# Patient Record
Sex: FEMALE | Race: WHITE | NOT HISPANIC OR LATINO | URBAN - METROPOLITAN AREA
[De-identification: names, ages, dates, MRNs, and addresses within clinical notes are randomized per-mention and may not be internally consistent; named-entity substitution may affect disease eponyms.]

---

## 2018-10-09 NOTE — PATIENT DISCUSSION
CATARACT, OU - VISUALLY SIGNIFICANT OS &gt; OD. SCHEDULE SX OS THEN WILL HOLD OFF ON THE OD FOR NOW AND EVALUATE AFTER SURGERY.

## 2018-10-09 NOTE — PATIENT DISCUSSION
DISCUSSED POTENTIAL MATIPLUG STUDY, WILL HOLD OFF ON DROPS FOR NOW. PATIENT WOULD LIKE GENERIC DROPS IF DECLINES STUDY.

## 2018-10-09 NOTE — PATIENT DISCUSSION
Surgery  Counseling: I have discussed the option of glasses versus cataract surgery versus following. It was explained that when vision no longer meets the patient's visual needs and a new prescription for glasses is not likely to improve the patient's visual symptoms, the option of cataract surgery is a reasonable next step. It was explained that there is no guarantee that removing the cataract will improve their visual symptoms, however; it is believed that the cataract is contributing to the patient's visual impairment and surgery may significantly improve both the visual and functional status of the patient. The risks, benefits and alternatives of surgery were discussed with the patient. After this discussion, the patient desires to proceed with cataract surgery with implantation of an intraocular lens to improve night vision .

## 2018-10-18 NOTE — PATIENT DISCUSSION
Surgery  Counseling: I have discussed the option of glasses versus cataract surgery versus following . It was explained that when vision no longer meets the patient's visual needs and a new prescription for glasses is not likely to improve all of the patient's visual symptoms, the option of cataract surgery is a reasonable next step. It was explained that there is no guarantee that removing the cataract will improve their visual symptoms, however; it is believed that the cataract is contributing to the patient's visual impairment and surgery may significantly improve both the visual and functional status of the patient. The risks, benefits and alternatives of surgery were discussed with the patient. After this discussion, the patient desires to proceed with cataract surgery with implantation of an intraocular lens to improve vision for distance and glare.

## 2018-10-18 NOTE — PATIENT DISCUSSION
Continue: prednisolone acetate (PF) (prednisolone acetate (pf)): drops,suspension: 1% 1 drop once a day as directed into right eye

## 2020-03-04 NOTE — PATIENT DISCUSSION
"Urogyn follow up    [facility]  [address]  [facility] [address]    Sheree Lomeli  [id number]  1958    Routine GYN care: Darlien Lawrence NP    Visit 2012:  Sheree Lomeli is a 54 y.o. G3, P2-0-1-2 here for a urogyn follow up.    Mrs. Yani gill is a 54-year-old G3, P2-0-1-2 who reports a   suspected rectal injury after doing house work approximately three years   ago. She describes sensation of a rectal "tear" on excessive extension of   her back. Since that time, she has undergone evaluation per several   gastroenterology specialist. Colonoscopy 2006 is normal. A barium enema   2011 is suboptimal due to increased amount of stool in the bowel. She also  reports consultation with an orthopedist and MRI significant for   degenerative joint disease, but negative for neurologic injury. She   initially had discomfort in the left perianal area and sense of vaginal   bulge. Recently, she began to have issues with complete evacuation and   often splints perineally to help evacuate. She also notes use of MiraLax   can be helpful. Exam reveals a distal rectocele without obvious enterocele  or perineocele. There is tenderness to palpation perianally, especially at  the patient's left. There is also mild tenderness to palpation at the   internal levator ani complex, although no increased muscle tone. Rectal   exam reveals normal resting tone, but mildly decreased concentric squeeze.   Otherwise, there are no significant sphincter abnormalities. Perineal   sensation is grossly intact.     Last visit 3/2018:  In July, patient describes an episode of constipation lasting 6 days that caused increasing back pain. She used a colon cleanse to finally pass a BM. The constipation led to increasing back pain as described before. At this time she noticed 4 small brown spots about the size of a freckle on the left external labial skin when wiping. She noticed the bleeding originating from the brown spots. In August, " ***This patient had traditional cataract surgery performed. A monofocal  IOL was placed to achieve a target refraction of plano which should provide them with satisfactory  vision with the aid of glasses/contact lenses. *** noticed minimal bleeding on her pad again.     Pt has continued taking 4 teaspoons fiber daily 2AM and 2PM to help with BM's and has been successful. BM's daily without the need to splint.  Pt has been using Replens when she feels dry but denies vaginal estrogen use. She still describes mild stress incontinence but not bothersome. Pt continues to do kegel exercises to help strengthen her pelvic floor.     1. Perianal/rectal discomfort: The patient reports this is directly related to what sounds to be a musculoskeletal trauma. However, she has had significant workup per outside physicians with negative results. On exam, she does have some evidence of distal rectocele and often splints to help evacuate. Taking 25-30 g with fiber and fiber powder. Feels that pain is 70% better. Once has BM, pain is gone. Able to have BM most days. Is taking 4-5 tsps/day. No splinting anymore. No hematochezia. Does have some L-sided pressure before has BM, which is relieved with BM. Also gets back pain and foot pain before having BM, as well--improves with stool passage.   --pain started after she was reaching for something years ago, felt pop; better after passage of BM or flatus; worse with sitting--has a standing desk now, and this helps  --saw pain MD--was hoping to for injection but couldn't find correct spot; did start neurontin with 90% improvement x 1 month; then, pain started to return and had some leg/foot pain, which became severe with increasing dose--stopped once she stopped med; so, has stopped this neurontin now  --tailbone pain is 3-4/10 currently; can be as low as 1-2/10  --has completed back PT and pelvic floor with Lauren Shepley  2. Mixed urinary incontinence:   --previously:  Currently not bothersome. Continue to monitor. Basic behavioral modifications reviewed. Still occasionally with cough/sneeze. Is doing Kegel's.   --currently:  Started to notice more UI with moving/changing position over last 5 days.  Volumes  are very small.  Uses small liner.  Does still have some OLVIN.  No U/F.  Frequency:  Q4-5 hours.  Nocturia: 0-1x/PM (better on neurontin). No dysuria, hematuria. +complete emptying.     3. Atrophic vaginitis: +/- using Replens twice a week.  4. Rectocele:  6/25/12 FL Defecogram: Video recording of defecation was performed revealing some increase in the anal rectal angle as expected with puborectalis relaxation. The anal canal opens with defecation. There is a small anterior rectocele, but there is no significant residual after evacuation . The patient performs a rocking motion with intermittent straining and relaxation to achieve emptying. IMPRESSION: Small anterior rectocele.  --currently: no major symptoms  5. Constipation, defecatory dysfunction:  Feels like she may be rocking a bit more to help empty.  Has bee taking fiber to keep stool consistency ideal.  Has started stool softener since starting gabapentin.      Past Medical History:   Diagnosis Date    Arthritis     Back pain     Chronic pelvic pain in female     left-sided    De Quervain's tenosynovitis     left    Headache(784.0)     Reflux     Right carpal tunnel syndrome     Urinary incontinence     occ olvin     Past Surgical History:   Procedure Laterality Date    EPIDURAL STEROID INJECTION N/A 6/12/2018    Procedure: INJECTION-STEROID-EPIDURAL;  Surgeon: Rianna Nina MD;  Location: [facility];  Service: Pain Management;  Laterality: N/A;  Coccygeal Nerve Block  28712  (0.5mg Niravam only)    *Pt wants to know if she has NO SEDATION at all, can pt drive herself home*    OOPHORECTOMY      LSO--pain     Issues include:  1. Perianal/rectal discomfort with low, left back pain: The patient reports this is directly related to what sounds to be a musculoskeletal trauma. However, she has had significant workup per outside physicians with negative results. On exam, she does have some evidence of distal rectocele and often splints to help evacuate.  Taking 25-30 g with fiber and fiber powder. Feels that pain is 70% better. Once has BM, pain is gone. Able to have BM most days. Is taking 4-5 tsps/day. No splinting anymore. No hematochezia. Does have some L-sided pressure before has BM, which is relieved with BM. Also gets back pain and foot pain before having BM, as well--improves with stool passage.   --pain started after she was reaching for something years ago, felt pop; better after passage of BM or flatus; worse with sitting--has a standing desk now, and this helps  --saw pain MD--was hoping to for injection but couldn't find correct spot; did start neurontin with 90% improvement x 1 month; then, pain started to return and had some leg/foot pain, which became severe with increasing dose--stopped once she stopped med; so, has stopped this neurontin now; flexeril PRN helps back pain   --tailbone pain is 3-4/10 currently; can be as low as 1-2/10  --has completed back PT and pelvic floor with Lauren Shepley    TODAY:  --still has some issues with low back/left tailbone & buttock pain--mostly when she reaches forward, feels like she can only go so far before something will be very painful  --takes flexeril PRN--makes her sleepy enough so doesn't feel pain, not sure it really helps pain, though  --the only thing that alleviates pain is if she has BM or does colon cleanse/completely evacuates colon (can go 3 days without pain then)  --wakes in AM, has some pain; takes fiber, has BM easily QAM and pain greatly lessens; if doesn't have AM BM, can have worsening of pain; if has BM, pain starts to increase again as day goes on, can improve if has another BM later in day  --did have PF PT in past--didn't help but starting healthy back program in March  --not needing to splint anymore  2. Mixed urinary incontinence:   --previously:  Currently not bothersome. Continue to monitor. Basic behavioral modifications reviewed. Still occasionally with cough/sneeze. Is doing  Kegel's.   --currently:  Mild OLVIN (mostly with sneeze/cough/blowing nose).  No UUI. Volumes are very small.  Uses small liner. Frequency:  Q4-5 hours.  Nocturia: 0-1x/PM (better on neurontin). No dysuria, hematuria. +complete emptying.     3. Atrophic vaginitis: was using Replens; was switched to estradiol cream 2x/week  4. Rectocele:  6/25/12 FL Defecogram: Video recording of defecation was performed revealing some increase in the anal rectal angle as expected with puborectalis relaxation. The anal canal opens with defecation. There is a small anterior rectocele, but there is no significant residual after evacuation . The patient performs a rocking motion with intermittent straining and relaxation to achieve emptying. IMPRESSION: Small anterior rectocele.  --currently: feels some pressure on L side, can see when has mirror down there  5. Constipation, defecatory dysfunction:    --taking fiber 4 tsps/day has BM every AM, which is easy to pass  6. Abnormal pelvic US 3/3/2020:  --PCP got due to continued pelvic/back pain  Uterus:    Size: 5.8 x 2.8 x 4.2 cm    Appearance: Well-defined echogenic mass within the endocervical canal measuring 0.8 x 0.6 x 0.6 cm with significant vascular flow.  Complex fluid within the cervical canal.    Endometrium: Normal in this post menopausal patient, measuring 1 mm.    Right ovary:    Not visualized.    Left ovary:    Surgically absent.    Free Fluid:    None.      Impression       Echogenic mass within the endocervical canal measuring up to 0.8 cm, likely representing an endocervical polyp.  Complex fluid throughout the endocervical canal.  Recommend direct visualization and histologic sampling.     Well-woman:  --pap: 9/2018, normal; HPV neg  --mammogram: 2019, normal  --colonoscopy: 2006, normal.   --DEXA: 2011, normal    Medications:  Current Outpatient Medications   Medication Sig Dispense Refill    butalbital-aspirin-caffeine -40 mg (FIORINAL) -40 mg Cap Take 1  "capsule by mouth every 4 (four) hours as needed. 30 capsule 0    cyclobenzaprine (FLEXERIL) 5 MG tablet       estradiol (ESTRACE) 0.01 % (0.1 mg/gram) vaginal cream 0.5 grams nightly x 2 weeks, then 2x/week thereafter 42.5 g 11    ibuprofen (ADVIL,MOTRIN) 600 MG tablet TAKE 1 TABLET(600 MG) BY MOUTH EVERY 4 HOURS AS NEEDED 30 tablet 0    omeprazole (PRILOSEC) 40 MG capsule Take 1 capsule (40 mg total) by mouth daily as needed (heartburn). Take only as needed. 90 capsule 0     No current facility-administered medications for this visit.      ROS: As per HPI.     Exam  BP (!) 149/77 (BP Location: Right arm, Patient Position: Sitting, BP Method: Medium (Automatic))   Ht 5' 4" (1.626 m)   Wt 69.1 kg (152 lb 5.4 oz)   LMP  (LMP Unknown)   BMI 26.15 kg/m²   GEN: WD, WN, NAD  Abd: soft, NT, ND    Pelvic:  External: NEFG  Internal: Mild TTP at LV/OI B. No masses. Cervix normal.  Uterus normal size, NT, mobile.   POPQ:  Aa/Ba -2, Ap/Bp -1 (very distal rectocele), TVL 11-12, C -7, D -8.    Rectal: normal external.  Internal normal active/passive tone, no masses/blood.  Very slight distal pocketing but minimal. Mild TTP at LV B.      Impression  No diagnosis found.    Plan:   1. Perianal/rectal discomfort: Count daily fiber (goal b/w 15-30 g). Increase fiber to 6 tps/day. Increase hydration. If bulge (rectocele) starts to become more prominent or if you have more difficulty evacuating/increased pain, let me know to reconsider options. If no BM, take milk of magnesia 1-2 tablespoons. Would need to see neurosurgeon if back pain worsens.   --continue pelvic floor PT home exercises--pelvic floor muscles are mildly TTP on exam but most pain seems to be hip/back releted  --start healthy back  --try SI joint stretches/exercises  --MRI lumbar/sacrum reviewed per pain MD without obvious contributors  2. Mixed urinary incontinence:   --continue pelvic floor PT to strengthen muscles: refocus on strengthening at this " point  --continue emptying every 3 hours. Empty well: wait a minute, lean forward on toilet. Let me know if worsens.   --consider pessary or sling (surgery if worsens)  3. Constipation with incomplete defecation and very slight, distal rectocele (stable):  --we discussed fact that rectocele is very small and probably not main issue (should not cause pain)--may have some incomplete emptying but getting stool consistency optimized may help  --increase hydration: drink 3-4 bottles of water/day in addition to what you drink with meals  --watch caffeine intake; make sure to replace lost water after/before drinking caffeine  --get regular exercise  --continue daily fiber supplement  --start daily magnesium oxide 400 IU or mg a day  --use stool softener (ducosate sodium) 1-2 times/day if needed  --schedule colonoscopy, as is due  4.abnormal pelvic US:  --no symptoms--probable endometrial polyp identified incidentally, as PCP got US to evaluate pelvic contributors to pain  --patient understands the polyp is most likely unrelated to her pain, but we should investigate now that US has been done  --discussed need for hysteroscopy, possible polypectomy, D&C to evaluate: R/B/A generally reviewed. We discussed adding posterior repair, but she would like to wait, as this is unlikely contributing to her back pain.  Patient ok to sign consents DOS.  Recent CBC/CMP normal (2/2020).  Overall, no significant PMH.  Will try to add to schedule in next few weeks.     45 minutes were spent in face to face time with this patient  90 % of this time was spent in counseling and/or coordination of care  @  [facility]  Division of Female Pelvic Medicine and Reconstructive Surgery  Department of Obstetrics & Gynecology

## 2022-02-24 ENCOUNTER — NEW PATIENT (OUTPATIENT)
Dept: URBAN - METROPOLITAN AREA CLINIC 1 | Facility: CLINIC | Age: 67
End: 2022-02-24

## 2022-02-24 DIAGNOSIS — H04.123: ICD-10-CM

## 2022-02-24 DIAGNOSIS — H25.813: ICD-10-CM

## 2022-02-24 DIAGNOSIS — H16.143: ICD-10-CM

## 2022-02-24 PROCEDURE — 92015 DETERMINE REFRACTIVE STATE: CPT

## 2022-02-24 PROCEDURE — 99204 OFFICE O/P NEW MOD 45 MIN: CPT

## 2022-02-24 ASSESSMENT — VISUAL ACUITY
OD_CC: 20/50-2
OS_AM: 20/60
OS_CC: J2
OD_PAM: 20/80
OD_CC: J2
OS_CC: 20/40

## 2022-02-24 ASSESSMENT — TONOMETRY
OD_IOP_MMHG: 13
OS_IOP_MMHG: 13

## 2022-02-24 NOTE — PATIENT DISCUSSION
Visually Significant secondary to glare, discussed the risks, benefits, alternatives, and limitations of cataract surgery. The patient stated a full understanding and a desire to proceed with the procedure. The patient will need to return for pre-op appointment with cataract measurements and to have any additional questions answered, and start pre-operative eye drops as directed. **PMG did not see today**.

## 2022-03-17 ENCOUNTER — PRE-OP/H&P (OUTPATIENT)
Dept: URBAN - METROPOLITAN AREA CLINIC 1 | Facility: CLINIC | Age: 67
End: 2022-03-17

## 2022-03-17 VITALS
HEIGHT: 65 IN | WEIGHT: 170 LBS | DIASTOLIC BLOOD PRESSURE: 120 MMHG | HEART RATE: 77 BPM | BODY MASS INDEX: 28.32 KG/M2 | SYSTOLIC BLOOD PRESSURE: 135 MMHG

## 2022-03-17 DIAGNOSIS — H04.123: ICD-10-CM

## 2022-03-17 DIAGNOSIS — H40.1132: ICD-10-CM

## 2022-03-17 DIAGNOSIS — H25.813: ICD-10-CM

## 2022-03-17 DIAGNOSIS — H16.143: ICD-10-CM

## 2022-03-17 PROCEDURE — 92136 OPHTHALMIC BIOMETRY: CPT

## 2022-03-17 PROCEDURE — 92012 INTRM OPH EXAM EST PATIENT: CPT

## 2022-03-17 ASSESSMENT — VISUAL ACUITY
OD_CC: 20/30
OS_CC: 20/25

## 2022-03-17 ASSESSMENT — TONOMETRY
OS_IOP_MMHG: 15
OD_IOP_MMHG: 15

## 2022-03-17 NOTE — PATIENT DISCUSSION
Cataract OU -- Visually Significant (Secondary to glare), discussed the risks, benefits, alternatives, and limitations of cataract surgery. The patient stated a full understanding and a desire to proceed with the procedure. Discussed with patient if post-op drops are more than $120 for all three combined when filling at their Pharmacy, please call our office to request generic substitutions. Schedule Phaco PCL OS then OD.

## 2022-03-23 ENCOUNTER — SURGERY/PROCEDURE (OUTPATIENT)
Dept: URBAN - METROPOLITAN AREA SURGERY 1 | Facility: SURGERY | Age: 67
End: 2022-03-23

## 2022-03-23 DIAGNOSIS — H25.812: ICD-10-CM

## 2022-03-23 PROCEDURE — 66984 XCAPSL CTRC RMVL W/O ECP: CPT

## 2022-03-24 ENCOUNTER — POST-OP (OUTPATIENT)
Dept: URBAN - METROPOLITAN AREA CLINIC 1 | Facility: CLINIC | Age: 67
End: 2022-03-24

## 2022-03-24 DIAGNOSIS — Z96.1: ICD-10-CM

## 2022-03-24 PROCEDURE — 99024 POSTOP FOLLOW-UP VISIT: CPT

## 2022-03-24 ASSESSMENT — VISUAL ACUITY: OS_SC: 20/20

## 2022-03-24 ASSESSMENT — TONOMETRY: OS_IOP_MMHG: 18

## 2022-03-31 ENCOUNTER — POST OP/EVAL OF SECOND EYE (OUTPATIENT)
Dept: URBAN - METROPOLITAN AREA CLINIC 1 | Facility: CLINIC | Age: 67
End: 2022-03-31

## 2022-03-31 VITALS — HEART RATE: 92 BPM | DIASTOLIC BLOOD PRESSURE: 81 MMHG | SYSTOLIC BLOOD PRESSURE: 118 MMHG | HEIGHT: 55 IN

## 2022-03-31 DIAGNOSIS — Z96.1: ICD-10-CM

## 2022-03-31 PROCEDURE — 99024 POSTOP FOLLOW-UP VISIT: CPT

## 2022-03-31 ASSESSMENT — VISUAL ACUITY
OD_SC: 20/100
OS_SC: 20/25

## 2022-03-31 ASSESSMENT — TONOMETRY
OD_IOP_MMHG: 16
OS_IOP_MMHG: 17

## 2022-03-31 NOTE — PATIENT DISCUSSION
Cataract OU -- Visually Significant (Secondary to glare), discussed the risks, benefits, alternatives, and limitations of cataract surgery. The patient stated a full understanding and a desire to proceed with the procedure. Discussed with patient if post-op drops are more than $120 for all three combined when filling at their Pharmacy, please call our office to request generic substitutions. Schedule Phaco PCL OD.

## 2022-04-06 ENCOUNTER — SURGERY/PROCEDURE (OUTPATIENT)
Dept: URBAN - METROPOLITAN AREA SURGERY 1 | Facility: SURGERY | Age: 67
End: 2022-04-06

## 2022-04-06 DIAGNOSIS — H25.811: ICD-10-CM

## 2022-04-06 PROCEDURE — 66984 XCAPSL CTRC RMVL W/O ECP: CPT

## 2022-04-07 ENCOUNTER — POST-OP (OUTPATIENT)
Dept: URBAN - METROPOLITAN AREA CLINIC 1 | Facility: CLINIC | Age: 67
End: 2022-04-07

## 2022-04-07 DIAGNOSIS — Z96.1: ICD-10-CM

## 2022-04-07 PROCEDURE — 99024 POSTOP FOLLOW-UP VISIT: CPT

## 2022-04-07 ASSESSMENT — TONOMETRY
OS_IOP_MMHG: 15
OD_IOP_MMHG: 20

## 2022-04-07 ASSESSMENT — VISUAL ACUITY
OS_SC: 20/25
OD_SC: 20/30

## 2022-04-07 NOTE — PATIENT DISCUSSION
Patient is calling for 2 reasons:    1)  To report yellow spots in stool. Needs to address with PCP/GI if needed.        2)  [facility] is saying that they never received rx for Wellbutrin XL  on 2/8/21 Use  gtts through completion of PO gtt chart regimen/ Per our instructions given to patient.

## 2022-04-21 ENCOUNTER — EMERGENCY VISIT (OUTPATIENT)
Dept: URBAN - METROPOLITAN AREA CLINIC 1 | Facility: CLINIC | Age: 67
End: 2022-04-21

## 2022-04-21 DIAGNOSIS — H04.123: ICD-10-CM

## 2022-04-21 DIAGNOSIS — H16.143: ICD-10-CM

## 2022-04-21 PROCEDURE — 92012 INTRM OPH EXAM EST PATIENT: CPT

## 2022-04-21 ASSESSMENT — TONOMETRY
OD_IOP_MMHG: 14
OS_IOP_MMHG: 12

## 2022-04-21 ASSESSMENT — VISUAL ACUITY
OD_SC: 20/20-2
OS_SC: 20/20

## 2022-04-21 NOTE — PATIENT DISCUSSION
Advised to DC post op gtts OS, continue OD through gtt instruction chart. Recommend switch to PF ATs TID OU, routinely.

## 2022-07-11 ENCOUNTER — EMERGENCY VISIT (OUTPATIENT)
Dept: URBAN - METROPOLITAN AREA CLINIC 1 | Facility: CLINIC | Age: 67
End: 2022-07-11

## 2022-07-11 DIAGNOSIS — S00.12XA: ICD-10-CM

## 2022-07-11 DIAGNOSIS — H04.123: ICD-10-CM

## 2022-07-11 PROCEDURE — 99213 OFFICE O/P EST LOW 20 MIN: CPT

## 2022-07-11 ASSESSMENT — VISUAL ACUITY
OS_SC: 20/25
OD_SC: 20/25

## 2022-07-11 ASSESSMENT — TONOMETRY
OS_IOP_MMHG: 13
OD_IOP_MMHG: 13

## 2022-07-11 NOTE — PATIENT DISCUSSION
No intraocular damage noted. Diagnosis discussed with the patient in great detail. No treatment needed. F/u as scheduled 9/2022.

## 2022-09-09 ENCOUNTER — COMPREHENSIVE EXAM (OUTPATIENT)
Dept: URBAN - METROPOLITAN AREA CLINIC 1 | Facility: CLINIC | Age: 67
End: 2022-09-09

## 2022-09-09 DIAGNOSIS — H04.123: ICD-10-CM

## 2022-09-09 DIAGNOSIS — H16.143: ICD-10-CM

## 2022-09-09 DIAGNOSIS — Z96.1: ICD-10-CM

## 2022-09-09 DIAGNOSIS — H40.023: ICD-10-CM

## 2022-09-09 PROCEDURE — 92015 DETERMINE REFRACTIVE STATE: CPT

## 2022-09-09 PROCEDURE — 92133 CPTRZD OPH DX IMG PST SGM ON: CPT

## 2022-09-09 PROCEDURE — 99214 OFFICE O/P EST MOD 30 MIN: CPT

## 2022-09-09 ASSESSMENT — TONOMETRY
OS_IOP_MMHG: 15
OD_IOP_MMHG: 15

## 2022-09-09 ASSESSMENT — VISUAL ACUITY
OD_SC: 20/40
OS_SC: 20/60

## 2022-09-09 NOTE — PATIENT DISCUSSION
(CD 0.30 OU) OCT WNL OU. IOP stable. Pt was dx with COAG elsewhere. Per PMG, his impression is the patient does not have COAG. S/p PI OU. Patient is considered high risk. Condition was discussed with patient and patient understands. Will continue to monitor patient for any progression in condition. Patient was advised to call us with any problems, questions, or concerns.

## 2022-09-15 ENCOUNTER — EMERGENCY VISIT (OUTPATIENT)
Dept: URBAN - METROPOLITAN AREA CLINIC 1 | Facility: CLINIC | Age: 67
End: 2022-09-15

## 2022-09-15 DIAGNOSIS — H00.12: ICD-10-CM

## 2022-09-15 PROCEDURE — 99213 OFFICE O/P EST LOW 20 MIN: CPT

## 2022-09-15 RX ORDER — CEPHALEXIN 500 MG/1: 1 CAPSULE ORAL TWICE A DAY

## 2022-09-15 ASSESSMENT — VISUAL ACUITY
OD_SC: 20/30
OS_SC: 20/50

## 2022-09-15 ASSESSMENT — TONOMETRY
OD_IOP_MMHG: 15
OS_IOP_MMHG: 15

## 2022-09-15 NOTE — PATIENT DISCUSSION
Diagnosis discussed with the patient in detail. Will have the patient begin the use of Keflex 500 mg PO BID(erx'd) and Begin hot compresses TID x 5 minutes for 3 weeks. If without improvement discussed with patient possible Incision and Drainage procedure. Risks and benefits discussed with patient and patient states full understanding. ** Okay for patient to continue the use of Cipro for her J pouch per RBF***. F/u 1 week 1-0.

## 2022-09-22 ENCOUNTER — FOLLOW UP (OUTPATIENT)
Dept: URBAN - METROPOLITAN AREA CLINIC 1 | Facility: CLINIC | Age: 67
End: 2022-09-22

## 2022-09-22 DIAGNOSIS — H00.12: ICD-10-CM

## 2022-09-22 PROCEDURE — 99213 OFFICE O/P EST LOW 20 MIN: CPT

## 2022-09-22 ASSESSMENT — VISUAL ACUITY
OD_SC: 20/30
OS_SC: 20/30

## 2022-09-22 ASSESSMENT — TONOMETRY
OS_IOP_MMHG: 15
OD_IOP_MMHG: 14

## 2022-09-22 NOTE — PATIENT DISCUSSION
Resolving. Continue hot compresses QD-BID x 5 minutes for 3 weeks. If without improvement discussed with patient possible Incision and Drainage procedure. Risks and benefits discussed with patient and patient states full understanding. ** Okay for patient to continue the use of Cipro for her J pouch per RBF***.

## 2025-04-30 ENCOUNTER — COMPREHENSIVE EXAM (OUTPATIENT)
Age: 70
End: 2025-04-30

## 2025-04-30 DIAGNOSIS — H52.13: ICD-10-CM

## 2025-04-30 DIAGNOSIS — H52.221: ICD-10-CM

## 2025-04-30 DIAGNOSIS — H52.4: ICD-10-CM

## 2025-04-30 DIAGNOSIS — Z01.00: ICD-10-CM

## 2025-04-30 PROCEDURE — 92015 DETERMINE REFRACTIVE STATE: CPT

## 2025-04-30 PROCEDURE — 92014 COMPRE OPH EXAM EST PT 1/>: CPT
